# Patient Record
Sex: FEMALE | Race: WHITE
[De-identification: names, ages, dates, MRNs, and addresses within clinical notes are randomized per-mention and may not be internally consistent; named-entity substitution may affect disease eponyms.]

---

## 2020-02-05 ENCOUNTER — HOSPITAL ENCOUNTER (EMERGENCY)
Dept: HOSPITAL 59 - ER | Age: 30
Discharge: HOME | End: 2020-02-05
Payer: MEDICAID

## 2020-02-05 DIAGNOSIS — R53.1: ICD-10-CM

## 2020-02-05 DIAGNOSIS — B34.9: Primary | ICD-10-CM

## 2020-02-05 DIAGNOSIS — R05: ICD-10-CM

## 2020-02-05 DIAGNOSIS — R51: ICD-10-CM

## 2020-02-05 DIAGNOSIS — E06.3: ICD-10-CM

## 2020-02-05 LAB
FLUBV AG SPEC QL IA: NEGATIVE
LEAD BLD-MCNC: NEGATIVE UG/DL

## 2020-02-05 PROCEDURE — 96374 THER/PROPH/DIAG INJ IV PUSH: CPT

## 2020-02-05 PROCEDURE — 87400 INFLUENZA A/B EACH AG IA: CPT

## 2020-02-05 PROCEDURE — 99284 EMERGENCY DEPT VISIT MOD MDM: CPT

## 2020-02-05 PROCEDURE — 96375 TX/PRO/DX INJ NEW DRUG ADDON: CPT

## 2020-02-05 RX ADMIN — KETOROLAC TROMETHAMINE ONE MG: 30 INJECTION, SOLUTION INTRAMUSCULAR; INTRAVENOUS at 21:21

## 2020-02-05 RX ADMIN — KETOROLAC TROMETHAMINE ONE MG: 30 INJECTION, SOLUTION INTRAMUSCULAR; INTRAVENOUS at 21:24

## 2020-02-05 NOTE — EMERGENCY DEPARTMENT RECORD
History of Present Illness





- General


Chief complaint: Flu Like Symptoms


Stated complaint: FEVER,COUGH


Time Seen by Provider: 20 20:45


Source: Patient


Mode of Arrival: Ambulatory


Limitations: No limitations





- History of Present Illness


Initial comments: 





30 yo female presents to ED for evaluation of fever, body aches, cough, and 

headache symptoms that began 3 days ago.  Patient reports that her two children 

have tested positive for influenza, reports that her headache will not improve 

despite taking over the counter medications at home.  Patient denies productive 

cough, abdominal pain, urinary symptoms, or flank pain.  Patient report history 

of Hashimoto's thyroiditis and being evaluated for auto-immune diseases through 

her PCP.


MD Complaint: Generalized weakness


Onset/Timin


-: Days(s)


Location: Generalized, Face


Severity: Severe


Severity scale (1-10): 9


Quality: Other


Consistency: Constant


Improves with: None


Worsens with: Other (Coughing)


Context: Recent illness


Associated Symptoms: Fever/chills, Myalgias





- [place] Coma Scale


Eye Response: (4) Open spontaneously


Motor Response: (6) Obeys commands


Verbal Response: (5) Oriented


Chava Total: 15





- Related Data


                                Home Medications











 Medication  Instructions  Recorded  Confirmed  Last Taken


 


Clindamycin Phosphate  20  Unknown


 


Erythromycin Base [Erythromycin  20  Unknown





OPTH Ointment]    


 


Fluorometholone  20 Unknown


 


Levothyroxine Sodium  20  Unknown


 


Pilocarpine HCl  20  Unknown


 


Polyethylene Glycol 3350  20  Unknown











                                    Allergies











Allergy/AdvReac Type Severity Reaction Status Date / Time


 


acetaminophen [From Vicodin] Allergy  HIVES Verified 20 20:57


 


amoxicillin Allergy  HIVES Verified 20 20:57


 


hydrocodone [From Vicodin] Allergy  HIVES Verified 20 20:57














Travel Screening





- Travel/Exposure Within Last 30 Days


Have you traveled within the last 30 days?: No





- Travel/Exposure Within Last Year


Have you traveled outside the [place] in the last year?: No





- Additonal Travel Details


Have you been exposed to anyone with a communicable illness?: Yes


Exposure Details:: influenza b





- Travel Symptoms


Symptom Screening: None





Review of Systems


Constitutional: Reports: Fever, Malaise, Weakness.  Denies: Chills, Night sweats


Eyes: Denies: Eye discharge, Eye pain


ENT: Denies: Congestion, Ear pain, Epistaxis


Respiratory: Reports: Cough.  Denies: Dyspnea


Cardiovascular: Denies: Dyspnea on exertion, Edema


Endocrine: Denies: Fatigue, Heat or cold intolerance


Gastrointestinal: Denies: Abdominal pain, Nausea, Vomiting


Genitourinary: Denies: Incontinence, Retention


Musculoskeletal: Reports: Myalgia.  Denies: Arthralgia, Back pain


Skin: Denies: Bruising, Change in color


Neurological: Reports: Headache.  Denies: Abnormal gait, Confusion, Seizure


Psychiatric: Denies: Anxiety


Hematological/Lymphatic: Denies: Anemia, Blood Clots





Past Medical History





- SOCIAL HISTORY


Smoking Status: Never smoker


Alcohol Use: None


Drug Use: None





- RESPIRATORY


Hx Respiratory Disorders: No





- CARDIOVASCULAR


Hx Cardio Disorders: No





- NEURO


Hx Neuro Disorders: No


Hx Headaches: Yes





- GI


Hx GI Disorders: No





- 


Hx Genitourinary Disorders: No





- ENDOCRINE


Comment:: [email or web address]





- MUSCULOSKELETAL


Hx Musculoskeletal Disorders: No





- PSYCH


Hx Psych Problems: No





- HEMATOLOGY/ONCOLOGY


Hx Hematology/Oncology Disorders: No





Family Medical History


Any Significant Family History?: No





Physical Exam





- General


General Appearance: Alert, Oriented x3, Cooperative, Mild distress


Limitations: No limitations





- Head


Head exam: Atraumatic, Normocephalic, Normal inspection


Head exam detail: negative: Abrasion, Contusion, Kaur's sign, General 

tenderness, Hematoma, Laceration





- Eye


Eye exam: Normal appearance.  negative: Conjunctival injection, Periorbital 

swelling, Periorbital tenderness, Scleral icterus





- ENT


Ear exam: negative: Auricular hematoma, Auricular trauma


Nasal Exam: negative: Active bleeding, Discharge, Dried blood, Foreign body


Mouth exam: negative: Drooling, Laceration, Muffled voice, Tongue elevation





- Neck


Neck exam: Normal inspection.  negative: Meningismus, Tenderness





- Respiratory


Respiratory exam: Normal lung sounds bilaterally.  negative: Rales, Respiratory 

distress, Rhonchi, Stridor





- Cardiovascular


Cardiovascular Exam: Normal rhythm, Normal heart sounds, Tachycardia





- GI/Abdominal


GI/Abdominal exam: Soft.  negative: Rebound, Rigid, Tenderness





- Rectal


Rectal exam: Deferred





- 


 exam: Deferred





- Extremities


Extremities exam: Normal inspection.  negative: Pedal edema, Tenderness





- Back


Back exam: Denies: CVA tenderness (R), CVA tenderness (L)





- Neurological


Neurological exam: Alert, Normal gait, Oriented X3





- Psychiatric


Psychiatric exam: Normal affect, Normal mood





- Skin


Skin exam: Normal color.  negative: Abrasion


Type of lesion: negative: abrasion





Course





                                   Vital Signs











  20





  20:49


 


Temperature 99.4 F


 


Pulse Rate [ 120 H





Pulse Ox Probe] 


 


Respiratory 24





Rate 


 


Blood Pressure 165/117





[Left Arm] 


 


Pulse Ox 96














- Reevaluation(s)


Reevaluation #1: 





20 21:41


Influenza: Negative


Reevaluation #2: 





20 22:05


Patient was reassessed and reports that her headache symptoms are down from 9/10

to 3/10


Repeat examination does not reveal any meningeal signs on examination.


No other bacterial source of infection is present on examination.


Patient appears stable for discharge with symptomatic treatment as discussed.





Disposition


Disposition: Discharge


Clinical Impression: 


 Viral syndrome





Acute headache


Qualifiers:


 Headache type: unspecified Intractability: not intractable Qualified Code(s): 

R51 - Headache





Disposition: Home, Self-Care


Condition: (2) Stable


Instructions:  Viral Syndrome (ED)


Additional Instructions: 


Return to ED if your symptoms worsen or if you have any concerns.


Tylenol. Ibuprofen as directed.


Follow-up with your family doctor in 3-5 days as directed.


Forms:  Patient Portal Access


Time of Disposition: 22:06





Quality





- Quality Measures


Quality Measures: N/A





- Blood Pressure Screening


Does Patient Have Any of the Following: No


Blood Pressure Classification: Hypertensive Reading


Systolic Measurement: 165


Diastolic Measurement: 117


Screening for High Blood Pressure: < First Hypertensive BP, F/U Documented > 

[]


First Hypertensive Follow-up Interventions: Referral to alternative/primary care

 provider.